# Patient Record
Sex: FEMALE | NOT HISPANIC OR LATINO | URBAN - NONMETROPOLITAN AREA
[De-identification: names, ages, dates, MRNs, and addresses within clinical notes are randomized per-mention and may not be internally consistent; named-entity substitution may affect disease eponyms.]

---

## 2019-07-11 ENCOUNTER — APPOINTMENT (RX ONLY)
Dept: URBAN - NONMETROPOLITAN AREA CLINIC 13 | Facility: CLINIC | Age: 34
Setting detail: DERMATOLOGY
End: 2019-07-11

## 2019-07-11 DIAGNOSIS — Z41.9 ENCOUNTER FOR PROCEDURE FOR PURPOSES OTHER THAN REMEDYING HEALTH STATE, UNSPECIFIED: ICD-10-CM

## 2019-07-11 PROCEDURE — ? LASER COSMETIC

## 2019-07-11 ASSESSMENT — LOCATION SIMPLE DESCRIPTION DERM
LOCATION SIMPLE: NOSE
LOCATION SIMPLE: RIGHT CHEEK
LOCATION SIMPLE: LEFT CHEEK

## 2019-07-11 ASSESSMENT — LOCATION DETAILED DESCRIPTION DERM
LOCATION DETAILED: RIGHT INFERIOR CENTRAL MALAR CHEEK
LOCATION DETAILED: NASAL TIP
LOCATION DETAILED: LEFT INFERIOR CENTRAL MALAR CHEEK

## 2019-07-11 ASSESSMENT — LOCATION ZONE DERM
LOCATION ZONE: NOSE
LOCATION ZONE: FACE

## 2024-04-04 ENCOUNTER — APPOINTMENT (RX ONLY)
Dept: URBAN - NONMETROPOLITAN AREA CLINIC 13 | Facility: CLINIC | Age: 39
Setting detail: DERMATOLOGY
End: 2024-04-04

## 2024-04-04 DIAGNOSIS — Z41.9 ENCOUNTER FOR PROCEDURE FOR PURPOSES OTHER THAN REMEDYING HEALTH STATE, UNSPECIFIED: ICD-10-CM

## 2024-04-04 PROCEDURE — ? PULSED-DYE LASER

## 2024-04-04 ASSESSMENT — LOCATION SIMPLE DESCRIPTION DERM: LOCATION SIMPLE: NOSE

## 2024-04-04 ASSESSMENT — LOCATION DETAILED DESCRIPTION DERM
LOCATION DETAILED: NASAL SUPRATIP
LOCATION DETAILED: NASAL TIP

## 2024-04-04 ASSESSMENT — LOCATION ZONE DERM: LOCATION ZONE: NOSE

## 2024-04-04 NOTE — PROCEDURE: PULSED-DYE LASER
Laser Type: Vbeam 595nm
Pulse Duration: 10 ms
Delay Time (Ms): 20
Spot Size: 10 mm
Fluence In J/Cm2 (Optional): 6
Location Override: nose
Immediate Endpoint: erythema
Treated Area: small area
Post-Procedure Care: Post care reviewed with patient.
Spot Size: 3 mm
Fluence In J/Cm2 (Optional): 12
Cryogen Time (Ms): 30
Immediate Endpoint: purpura
Spot Size: 5 mm
Pulse Duration: 6 ms
Post-Care Instructions: I reviewed with the patient in detail post-care instructions. Patient should stay away from the sun and wear sun protection until treated areas are fully healed.
Fluence In J/Cm2 (Optional): 11
Detail Level: Zone
Comments: Angioma on nose
Cryogen Time (Ms): 0
Treated Area: medium area
Pulse Duration: .45 ms
Consent: Written consent obtained, risks reviewed including but not limited to crusting, scabbing, blistering, scarring, darker or lighter pigmentary change, incidental hair removal, bruising, and/or incomplete removal.
Price (Use Numbers Only, No Special Characters Or $): 105
Fluence In J/Cm2 (Optional): 14
Spot Size: 10x3 mm

## 2024-04-30 ENCOUNTER — APPOINTMENT (RX ONLY)
Dept: URBAN - NONMETROPOLITAN AREA CLINIC 13 | Facility: CLINIC | Age: 39
Setting detail: DERMATOLOGY
End: 2024-04-30

## 2024-04-30 DIAGNOSIS — Z41.9 ENCOUNTER FOR PROCEDURE FOR PURPOSES OTHER THAN REMEDYING HEALTH STATE, UNSPECIFIED: ICD-10-CM

## 2024-04-30 PROCEDURE — ? PULSED-DYE LASER

## 2024-04-30 NOTE — PROCEDURE: PULSED-DYE LASER
Location Override: nose
Immediate Endpoint: purpura
Treated Area: medium area
Laser Type: Vbeam 595nm
Spot Size: 10x3 mm
Price (Use Numbers Only, No Special Characters Or $): 125
Delay Time (Ms): 20
Immediate Endpoint: erythema
Consent: Written consent obtained, risks reviewed including but not limited to crusting, scabbing, blistering, scarring, darker or lighter pigmentary change, incidental hair removal, bruising, and/or incomplete removal.
Spot Size: 3 mm
Fluence In J/Cm2 (Optional): 14
Cryogen Time (Ms): 30
Cryogen Time (Ms): 0
Spot Size: 5 mm
Pulse Duration: 10 ms
Treated Area: small area
Fluence In J/Cm2 (Optional): 10
Detail Level: Zone
Pulse Duration: .45 ms
Post-Care Instructions: I reviewed with the patient in detail post-care instructions. Patient should stay away from the sun and wear sun protection until treated areas are fully healed.
Fluence In J/Cm2 (Optional): 12
Fluence In J/Cm2 (Optional): 6
Pulse Duration: 1.5 ms
Spot Size: 10 mm
Post-Procedure Care: Post care reviewed with patient.
Comments: Hemangiomas on nose

## 2024-05-28 ENCOUNTER — APPOINTMENT (RX ONLY)
Dept: URBAN - NONMETROPOLITAN AREA CLINIC 13 | Facility: CLINIC | Age: 39
Setting detail: DERMATOLOGY
End: 2024-05-28

## 2024-05-28 DIAGNOSIS — Z41.9 ENCOUNTER FOR PROCEDURE FOR PURPOSES OTHER THAN REMEDYING HEALTH STATE, UNSPECIFIED: ICD-10-CM

## 2024-05-28 PROCEDURE — ? PULSED-DYE LASER

## 2024-05-28 ASSESSMENT — LOCATION DETAILED DESCRIPTION DERM
LOCATION DETAILED: NASAL TIP
LOCATION DETAILED: NASAL SUPRATIP

## 2024-05-28 ASSESSMENT — LOCATION ZONE DERM: LOCATION ZONE: NOSE

## 2024-05-28 ASSESSMENT — LOCATION SIMPLE DESCRIPTION DERM: LOCATION SIMPLE: NOSE

## 2024-05-28 NOTE — PROCEDURE: PULSED-DYE LASER
Spot Size: 7 mm
Pulse Duration: 10 ms
Post-Procedure Care: Post care reviewed with patient.
Immediate Endpoint: erythema
Spot Size: 3 mm
Fluence In J/Cm2 (Optional): 10
Comments: Treated tip of nose.  It gets lighter or goes away then comes back.
Cryogen Time (Ms): 30
Immediate Endpoint: purpura
Fluence In J/Cm2 (Optional): 12
Detail Level: Zone
Consent: Written consent obtained, risks reviewed including but not limited to crusting, scabbing, blistering, scarring, darker or lighter pigmentary change, incidental hair removal, bruising, and/or incomplete removal.
Price (Use Numbers Only, No Special Characters Or $): 222
Pulse Duration: 1.5 ms
Cryogen Time (Ms): 0
Treated Area: medium area
Treated Area: small area
Delay Time (Ms): 20
Post-Care Instructions: I reviewed with the patient in detail post-care instructions. Patient should stay away from the sun and wear sun protection until treated areas are fully healed.
Spot Size: 10x3 mm
Fluence In J/Cm2 (Optional): 14
Laser Type: Vbeam 595nm
Location Override: nose
Fluence In J/Cm2 (Optional): 11

## 2024-06-28 ENCOUNTER — APPOINTMENT (RX ONLY)
Dept: URBAN - NONMETROPOLITAN AREA CLINIC 13 | Facility: CLINIC | Age: 39
Setting detail: DERMATOLOGY
End: 2024-06-28

## 2024-06-28 DIAGNOSIS — Z41.9 ENCOUNTER FOR PROCEDURE FOR PURPOSES OTHER THAN REMEDYING HEALTH STATE, UNSPECIFIED: ICD-10-CM

## 2024-06-28 PROCEDURE — ? PULSED-DYE LASER

## 2024-06-28 NOTE — PROCEDURE: PULSED-DYE LASER
Immediate Endpoint: erythema
Cryogen Time (Ms): 0
Spot Size: 10x3 mm
Fluence In J/Cm2 (Optional): 14
Immediate Endpoint: purpura
Pulse Duration: 1.5 ms
Detail Level: Zone
Comments: This is not responding like I want.  It gets lighter, smaller, then comes back.  Will send picture to Dr Angel.
Post-Procedure Care: Post care reviewed with patient.
Delay Time (Ms): 20
Treated Area: medium area
Cryogen Time (Ms): 30
Pulse Duration: 10 ms
Laser Type: Vbeam 595nm
Location Override: nose
Fluence In J/Cm2 (Optional): 10
Spot Size: 7 mm
Consent: Written consent obtained, risks reviewed including but not limited to crusting, scabbing, blistering, scarring, darker or lighter pigmentary change, incidental hair removal, bruising, and/or incomplete removal.
Spot Size: 3 mm
Treated Area: small area
Post-Care Instructions: I reviewed with the patient in detail post-care instructions. Patient should stay away from the sun and wear sun protection until treated areas are fully healed.
Fluence In J/Cm2 (Optional): 12